# Patient Record
Sex: MALE | Race: BLACK OR AFRICAN AMERICAN
[De-identification: names, ages, dates, MRNs, and addresses within clinical notes are randomized per-mention and may not be internally consistent; named-entity substitution may affect disease eponyms.]

---

## 2021-02-16 NOTE — SLEEP CARE CONSULTATION
Information from patient questionnaire entered by Kimberley Razo.





I have reviewed and concur with the information entered by Kimberley Razo. 

This document represents the service I personally performed and the decisions 

made by me, Jena Rainey ARNP.





History of Present Illness


Service Date and Time: 02/16/2021    1022


Initial Guilford Sleepiness Scale score: 7 (in 2021)


Current Gu[place] Sleepiness Scale score: 17


Additional HPI information: 





CHANDANA IBANEZ returns for follow up and results of the recently performed 

home sleep study.





The patient was informed of the following findings: no significant sleep 

disordered breathing with an average AHI of 2.7 and rock oxygen saturation of 

91%. His supine AHI was 3.3. 





I explained the pathophysiology behind obstructive sleep apnea. Patient does not

have sleep apnea and was advised how weight gain could increase the risk of 

developing sleep apnea in the future.





Patient has light snoring. Snoring can be reduced by weight loss. Weight loss is

best achieved with diet consult. Patient instructed to contact PCP for referral.

Snoring can also be treated with an oral appliance from a dentist. Advised to 

check insurance coverage. In addition, an ENT evaluation can be do to see if 

other treatment is indicated. Patient counseled not drink alcohol less than 4 

hours before bedtime as it can increase snoring and apnea.  Patient was 

cautioned about risks of drowsy driving until sleepiness symptoms resolve. 





Sleep Study





- Results


Type of Sleep Study: Home sleep study


Prior sleep studies: No


Polysomnography/Home Sleep Study results: 





Physician Impression:


The quality of the study is good. The length of the study is adequate (> 240 

minutes). Please also see the


tabulated and graphic data.





1. No significant sleep-disordered breathing, with an AHI of 2.7/hr and rock Sa

O2 of 91%. During the


study, the patient had 11 apneas (11 obstructive, 0 central, 0 mixed) and 5 

hypopneas. The longest


episode lasted 64.0 seconds. The few respiratory events occurred more frequently

during supine sleep


(supine AHI was 3.3 and non-supine, 1.27).





Allergies and Home Medications


Home medication list reviewed: Yes (no changes)





Review of Systems


Review of systems same as previous: Yes (no changes)





Physical Exam


Heart Rate: 80


O2 Saturation: 100


Height: 5 ft 9 in


Weight: 204 lb 11.2 oz


Body Mass Index: 30.2


BMI Classification: Obese





Impression and Plan





1. Suspected Obstructive Sleep Apnea-Hypopnea Syndrome, as suggested by a 

history of snoring, observed cessation of breath while asleep, gasping or 

choking in sleep, unrefreshed sleep, and excessive daytime sleepiness. Patient 

HST showed an average AHI of 2.7 with a rock oxygen saturation of 91%. His 

supine AHI was 3.3. Patient Guilford today was 17/24 and he was concerned that he

has been waking up gasping for air for many years. Patient advised that often 

weight loss will reduce snoring as well as apnea risk. An oral appliance can 

also be used for snoring. This would require a dental consultation. Patient 

cautioned not to use other online appliances as can cause bite issues. Patient 

is advised to check if insurance will cover. An ENT consult can also be helpful 

to determine if any other treatment is an option. Since he is somewhat 

borderline on his back for mild apnea and has compelling symptoms, we discussed 

options and patient would like to verify the results with an in lab study. I 

recommend proceeding to polysomnography to confirm the diagnosis and to assess 

severity. We obtained agreement to proceed. The pathophysiology of obstructive 

sleep apnea-hypopnea syndrome was discussed with the patient and health risks of

cardiovascular and cerebrovascular disease if not treated. Risks of drowsy 

driving discussed in detail and patient advised to avoid long distance driving 

and to pull over at the first sign of drowsiness. Patient agreed to plan. 





* Schedule polysomnography +- manual CPAP titration study and return in 1-2 

  weeks after the study to discuss result and initiate therapy.


* Avoid long distance driving or driving when feeling sleepy.


* Avoid alcohol, sedative and muscle relaxant around bedtime.


* Attempt to lose weight.


* Review instructions provided by trained office staff on how to prepare for the

  sleep study.


* Return for follow-up after sleep study completed.














Counseling Topics: Weight loss health impact


Visit Type: In Office


Time Spent with Patient (minutes): 22


Provider Statement: I spent 100% of the Face to Face Visit with the patient with

greater than 50% spent counseling the patient and coordination of care.

## 2021-03-04 NOTE — SLEEP CARE CONSULTATION
Information from patient questionnaire entered by Jordi Hewitt.





I have reviewed and concur with the information entered by Jordi Hewitt. This 

document represents the service I personally performed and the decisions made by

me, Jena Rainey ARNP.





History of Present Illness


Service Date and Time: 03/04/2021    1325


Initial [place] Sleepiness Scale score: 7 (in 2021)


Current [place] Sleepiness Scale score: 15


Additional HPI information: 





CHANDANA IBANEZ returns for follow up and results of the recently performed 

polysomnography.





The patient was informed of the following findings: no significant sleep 

disordered breathing with an average AHI of 1.1 and rock oxygen saturation of 

91%. 





I explained the pathophysiology behind obstructive sleep apnea. Patient does not

have sleep apnea and was advised how weight gain could increase the risk of 

developing sleep apnea in the future. I strongly encouraged the patient to lose 

weight.





Patient has loud snoring. Snoring can be reduced by weight loss. Weight loss is 

best achieved with diet consult. Patient instructed to contact PCP for referral.

Snoring can also be treated with an oral appliance from a dentist. Advised to 

check insurance coverage. In addition, an ENT evaluation can be do to see if 

other treatment is indicated. Patient counseled not drink alcohol less than 4 

hours before bedtime as it can increase snoring and apnea. Patient was cautioned

about risks of drowsy driving until sleepiness symptoms resolve. 





Sleep Study





- Results


Type of Sleep Study: Polysomnography


Prior sleep studies: Yes


Year and Where: 2021 [facility]


Polysomnography/Home Sleep Study results: 





IMPRESSION: The quality of the study is good. The patient had normal sleep 

efficiency. The sleep architecture


was No leg swelling. Respiratory monitoring showed no significant sleep d

isordered breathing (AHI = 1.1) or


hypoxia (rock oxygen saturation of 91%). The rare respiratory events occurred 

mainly during supine REM sleep


(supine AHI = 2.0; non-supine = 0.48). Snore was loud in intensity. There was no

significant periodic leg


movement of sleep. Cardiac rhythm was normal sinus rhythm without significant 

arrhythmia. No abnormal


behavior (parasomnia) observed during the night.





Allergies and Home Medications


Home medication list reviewed: Yes (no changes)





Review of Systems


Review of systems same as previous: Yes (no changes)





Physical Exam


Heart Rate: 81


O2 Saturation: 97


Height: 5 ft 9 in


Weight: 214 lb


Body Mass Index: 31.6


BMI Classification: Obese





Impression and Plan





Snoring but no significant sleep disordered breathing. Patient advised that 

often weight loss will reduce snoring as well as apnea risk. An oral appliance 

can also be used for snoring. This would require a dental consultation. Patient 

cautioned not to use other online appliances as can cause bite issues. A list of

accredited dentists in [facility] and one local dentist who makes oral appliances is 

available in our office. Patient is advised to check if insurance will cover. An

ENT consult can also be helpful to determine if any other treatment is an 

option.





* Attempt to lose weight


* Avoid alcohol consumption near bedtime


* The patient is cautioned about driving until sleepiness is completely 

  resolved.


* Return as needed.








Counseling Topics: Weight loss health impact


Visit Type: In Office


Time Spent with Patient (minutes): 13


Provider Statement: I spent 100% of the Face to Face Visit with the patient with

greater than 50% spent counseling the patient and coordination of care.

## 2022-05-12 ENCOUNTER — HOSPITAL ENCOUNTER (OUTPATIENT)
Dept: HOSPITAL 76 - DI | Age: 32
Discharge: HOME | End: 2022-05-12
Attending: PHYSICIAN ASSISTANT
Payer: COMMERCIAL

## 2022-05-12 DIAGNOSIS — S09.90XA: Primary | ICD-10-CM

## 2022-05-12 NOTE — MRI REPORT
PROCEDURE:  Brain W/O

 

INDICATIONS:  HEAD INJURY

 

TECHNIQUE:  

Noncontrast axial T1 spin echo, axial T2 fast spin echo, sagittal and axial FLAIR, coronal T2 fast sp
in echo, axial gradient echo, axial diffusion and ADC through the brain.  

 

COMPARISON:  None.

 

FINDINGS:  

Image quality:  Excellent.  

 

CSF Spaces:  Basal cisterns are patent.  No extra-axial fluid collections.  Ventricles are normal in 
size and shape.  

 

Brain:  No intracranial masses or hemorrhage.  Gray/white matter interface is normal.  Brainstem appe
ars normal.  Diffusion-weighted images demonstrate no acute ischemic insult.  No chronic ischemic ins
ults.  Normal intravascular flow voids are present.  

 

Skull and face:  Calvarium has normal marrow signal.  Orbits appear normal.  

 

Sinuses:  Sinuses and mastoids are clear.  

 

 

IMPRESSION:  

 

1. No intracranial disease process.

 

2. No abnormal intracranial mass or mass effect.

 

3. No intracranial hemorrhage.

 

Reviewed by: Martha Angeles MD, PhD on 5/12/2022 4:45 PM PDT

Approved by: Martha Angeles MD, PhD on 5/12/2022 4:45 PM PDT

 

 

Station ID:  [id number]